# Patient Record
Sex: FEMALE | Race: ASIAN | NOT HISPANIC OR LATINO
[De-identification: names, ages, dates, MRNs, and addresses within clinical notes are randomized per-mention and may not be internally consistent; named-entity substitution may affect disease eponyms.]

---

## 2023-02-10 ENCOUNTER — APPOINTMENT (OUTPATIENT)
Dept: UROLOGY | Facility: CLINIC | Age: 78
End: 2023-02-10
Payer: MEDICARE

## 2023-02-10 VITALS
BODY MASS INDEX: 25.82 KG/M2 | WEIGHT: 135 LBS | RESPIRATION RATE: 16 BRPM | HEIGHT: 60.63 IN | TEMPERATURE: 96.8 F | OXYGEN SATURATION: 98 % | DIASTOLIC BLOOD PRESSURE: 85 MMHG | HEART RATE: 58 BPM | SYSTOLIC BLOOD PRESSURE: 145 MMHG

## 2023-02-10 DIAGNOSIS — Z80.8 FAMILY HISTORY OF MALIGNANT NEOPLASM OF OTHER ORGANS OR SYSTEMS: ICD-10-CM

## 2023-02-10 DIAGNOSIS — Z80.1 FAMILY HISTORY OF MALIGNANT NEOPLASM OF TRACHEA, BRONCHUS AND LUNG: ICD-10-CM

## 2023-02-10 DIAGNOSIS — Z86.39 PERSONAL HISTORY OF OTHER ENDOCRINE, NUTRITIONAL AND METABOLIC DISEASE: ICD-10-CM

## 2023-02-10 DIAGNOSIS — Z00.00 ENCOUNTER FOR GENERAL ADULT MEDICAL EXAMINATION W/OUT ABNORMAL FINDINGS: ICD-10-CM

## 2023-02-10 DIAGNOSIS — Z78.9 OTHER SPECIFIED HEALTH STATUS: ICD-10-CM

## 2023-02-10 PROCEDURE — 99203 OFFICE O/P NEW LOW 30 MIN: CPT

## 2023-02-10 RX ORDER — ASCORBIC ACID 500 MG
TABLET,CHEWABLE ORAL
Refills: 0 | Status: ACTIVE | COMMUNITY

## 2023-02-11 LAB
APPEARANCE: CLEAR
BACTERIA UR CULT: NORMAL
BACTERIA: NEGATIVE
BILIRUBIN URINE: NEGATIVE
BLOOD URINE: NEGATIVE
COLOR: COLORLESS
GLUCOSE QUALITATIVE U: NEGATIVE
HYALINE CASTS: 0 /LPF
KETONES URINE: NEGATIVE
LEUKOCYTE ESTERASE URINE: NEGATIVE
MICROSCOPIC-UA: NORMAL
NITRITE URINE: NEGATIVE
PH URINE: 6
PROTEIN URINE: NEGATIVE
RED BLOOD CELLS URINE: 0 /HPF
SPECIFIC GRAVITY URINE: 1.01
SQUAMOUS EPITHELIAL CELLS: 0 /HPF
UROBILINOGEN URINE: NORMAL
WHITE BLOOD CELLS URINE: 0 /HPF

## 2023-02-11 NOTE — ADDENDUM
[FreeTextEntry1] : Entered by JACQUELINE CARLSON, acting as scribe for Dr. Didier Real.\par The documentation recorded by the scribe accurately reflects the service I personally performed and the decisions made by me.

## 2023-02-11 NOTE — LETTER BODY
[FreeTextEntry1] : Genaro Tafoya MD\par [address],\[place] [address]\par [phone]\par \par Dear Dr. Tafoya,\par \par Reason for Visit: Urge incontinence.\par \par This is a 77 year-old Mandarin speaking woman with urge incontinence. Patient is referred for evaluation of her condition. The patient reports occasional urge incontinence for the past 3-4 months. Patient denies any gross hematuria or dysuria. The patient denies any aggravating or relieving factors. The patient denies any interference of function. The patient is entirely asymptomatic. All other review of systems are negative. She has a family history of lung cancer in her father. She has no previous surgical history. Past medical history, family history and social history were inquired and were noncontributory to current condition. The patient does not use tobacco or drink alcohol. Medications and allergies were reviewed. She has no known allergies to medication.\par \par On examination, the patient is a healthy-appearing woman in no acute distress. She is alert and oriented and follows commands. She has normal mood and affect. She is normocephalic. Oral no thrush. Neck is supple. Respirations are unlabored. Abdomen is soft and nontender. Liver is nonpalpable. Bladder is nonpalpable. No CVA tenderness. Neurologically she is grossly intact. No peripheral edema. Skin without gross abnormality. On examination, the patient has a grade 3 cystocele. There was no evidence of leak with Valsalva or cough. \par \par Assessment: Urge incontinence. Cystocele.\par \par I counseled the patient. I discussed the various etiologies of her symptoms. In terms of her cystocele, I recommended the patient obtain a urinalysis and urine culture to evaluate for infection. I also recommended she see Dr. Darrion Gallegos for cystocele repair versus pessary placement. Risks and alternatives were discussed. I answered the patient's questions. The patient will follow-up as directed and will contact me with any questions or concerns. Thank you for the opportunity to participate in the care of Ms. YADAV. I will keep you updated on her progress.\par \par Plan: Urinalysis. Urine culture. See Dr. Darrion Gallegos. Follow up as directed.

## 2023-02-15 ENCOUNTER — APPOINTMENT (OUTPATIENT)
Dept: UROLOGY | Facility: CLINIC | Age: 78
End: 2023-02-15
Payer: MEDICARE

## 2023-02-15 VITALS — TEMPERATURE: 97.5 F

## 2023-02-15 DIAGNOSIS — N81.4 UTEROVAGINAL PROLAPSE, UNSPECIFIED: ICD-10-CM

## 2023-02-15 DIAGNOSIS — N81.10 CYSTOCELE, UNSPECIFIED: ICD-10-CM

## 2023-02-15 DIAGNOSIS — N39.41 URGE INCONTINENCE: ICD-10-CM

## 2023-02-15 PROCEDURE — 51798 US URINE CAPACITY MEASURE: CPT

## 2023-02-15 PROCEDURE — 99215 OFFICE O/P EST HI 40 MIN: CPT

## 2023-02-15 NOTE — END OF VISIT
[] : Resident [FreeTextEntry2] : The patient voided with instructions to empty their bladder. Afterwards, we performed a bladder ultrasound scan to obtain a post-void residual.  The estimated residual volume on the bladder scan was:  0 cc \par  [Time Spent: ___ minutes] : I have spent [unfilled] minutes of time on the encounter.

## 2023-02-15 NOTE — HISTORY OF PRESENT ILLNESS
[FreeTextEntry1] : 77 year old P1 () F w hx of vaginal bulge and discomfort presents as a referral from Dr Didier Real for cystocele.  Patient had seen her gynecologist in the past who let the patient know she had some uterovaginal prolapse as well.\par \par CLIVE denies\par UUI - rare\par \par Bowel movements mostly regular, at times straining\par \par PSHx:  No surgeries\par \par \par Colonoscopy - a few months ago\par

## 2023-02-15 NOTE — ASSESSMENT
[FreeTextEntry1] : Counseled the patient on constipation and how it can affect the urinary tract. We discussed increasing water intake, daily fruits and vegetables, and sources of fiber.  We recommended 4 servings of whole fruit per day, excluding dried fruit or juices.  We also recommended supplementing soluble fiber intake with gummy fiber #2/day.\par \par \par We discussed the etiology and management of pelvic organ prolapse.\par \par We discussed conservative non-operative interventions including weight loss, pelvic floor PT, and use of a pessary device.\par \par We discussed surgical interventions, including vaginal and abdominal approaches.  We discussed the r/b/a of obliterative vaginal surgery, or colpocleisis.  We also discussed vaginal native tissue prolapse repair. We also discussed abdominal sacrocolpopexy with mesh.\par \par \par PLAN\par \par she is most interested in sacrocolpopexy\par they are traveling back to [facility]\par will make final decision upon return\par \par she would like to get her TVUS done before she leaves\par ordered\par \par \par next steps if she chooses sacrocolpopexy\par UDS\par refer to gyn colleague \par

## 2023-02-15 NOTE — PHYSICAL EXAM
[General Appearance - Well Developed] : well developed [General Appearance - Well Nourished] : well nourished [Normal Appearance] : normal appearance [Well Groomed] : well groomed [General Appearance - In No Acute Distress] : no acute distress [Abdomen Soft] : soft [Abdomen Tenderness] : non-tender [Skin Color & Pigmentation] : normal skin color and pigmentation [] : no respiratory distress [Affect] : the affect was normal [Mood] : the mood was normal [Not Anxious] : not anxious [Normal Station and Gait] : the gait and station were normal for the patient's age [Urinary Bladder Findings] : the bladder was normal on palpation [FreeTextEntry1] : neg cst, cystocele stage 3, rectocele stage 1, apical descent , moderate